# Patient Record
Sex: FEMALE | Race: WHITE | NOT HISPANIC OR LATINO
[De-identification: names, ages, dates, MRNs, and addresses within clinical notes are randomized per-mention and may not be internally consistent; named-entity substitution may affect disease eponyms.]

---
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
## 2025-03-18 ENCOUNTER — EMERGENCY (EMERGENCY)
Facility: HOSPITAL | Age: 20
LOS: 1 days | Discharge: HOME | End: 2025-03-18
Admitting: EMERGENCY MEDICINE
Payer: MEDICAID

## 2025-03-18 VITALS
SYSTOLIC BLOOD PRESSURE: 103 MMHG | RESPIRATION RATE: 16 BRPM | OXYGEN SATURATION: 100 % | TEMPERATURE: 98 F | HEIGHT: 67 IN | HEART RATE: 60 BPM | DIASTOLIC BLOOD PRESSURE: 67 MMHG | WEIGHT: 125 LBS

## 2025-03-18 VITALS — DIASTOLIC BLOOD PRESSURE: 65 MMHG | HEART RATE: 55 BPM | SYSTOLIC BLOOD PRESSURE: 107 MMHG

## 2025-03-18 PROCEDURE — 99284 EMERGENCY DEPT VISIT MOD MDM: CPT

## 2025-03-18 RX ORDER — ACETAMINOPHEN 500 MG/5ML
975 LIQUID (ML) ORAL ONCE
Refills: 0 | Status: COMPLETED | OUTPATIENT
Start: 2025-03-18 | End: 2025-03-18

## 2025-03-18 RX ORDER — OXYCODONE HYDROCHLORIDE AND ACETAMINOPHEN 10; 325 MG/1; MG/1
1 TABLET ORAL ONCE
Refills: 0 | Status: DISCONTINUED | OUTPATIENT
Start: 2025-03-18 | End: 2025-03-18

## 2025-03-18 RX ADMIN — Medication 975 MILLIGRAM(S): at 20:15

## 2025-03-18 NOTE — ED ADULT TRIAGE NOTE - CHIEF COMPLAINT QUOTE
Patient c/o R lower dental pain x 3 days. Reports she had a root canal on that tooth. No swelling noted. Denies fever. Phx CVID, anemia, asthma, fibromyalgia

## 2025-03-18 NOTE — ED ADULT NURSE NOTE - NSFALLUNIVINTERV_ED_ALL_ED
Bed/Stretcher in lowest position, wheels locked, appropriate side rails in place/Call bell, personal items and telephone in reach/Instruct patient to call for assistance before getting out of bed/chair/stretcher/Non-slip footwear applied when patient is off stretcher/[place] to call system/Physically safe environment - no spills, clutter or unnecessary equipment/Purposeful proactive rounding/Room/bathroom lighting operational, light cord in reach

## 2025-03-18 NOTE — ED PROVIDER NOTE - NSFOLLOWUPINSTRUCTIONS_ED_ALL_ED_FT
Please use 600mg motrin (or advil or ibuprofen) every 6 hours as needed for pain/discomfort/swelling.   Take Tylenol 650mg (Two 325 mg pills) every 4-6 hours as needed for pain.     Follow up with [facility] private dentist or [facility] dental clinic as soon as possible.  Call [phone] for an appointment.  Please return to the ER for severe pain, fevers, severe bleeding, increased swelling or any other concerns.   Eat a soft diet and chew on the opposite side.  Salt gargle daily.   Brush your teeth several times a day.

## 2025-03-18 NOTE — ED PROVIDER NOTE - CONSTITUTIONAL, MLM
normal... Well appearing, awake, alert, oriented to person, place, time/situation and in discomfort.

## 2025-03-18 NOTE — ED PROVIDER NOTE - PATIENT PORTAL LINK FT
You can access the FollowMyHealth Patient Portal offered by [facility] by registering at the following website: [email or web address]. By joining [email or web address]’s FollowMyHealth portal, you will also be able to view your health information using other applications (apps) compatible with our system.

## 2025-03-18 NOTE — ED PROVIDER NOTE - TOOTH FINDINGS
right lower 2nd molar tooth with cental cavity, no gum swelling, no fluctuance, no gum tenderness, + tenderness with percussion,

## 2025-03-18 NOTE — ED PROVIDER NOTE - PROGRESS NOTE DETAILS
DERIC CRISTINA: spoke with dental, if no signs of abscess, no indication for treatment in the ED, recommend OTC pain medication f/u with dentist for outpatient follow up, can give antibiotic such as Augmentin if it was recently prescribed. DERIC CRISTINA: per RN, systolic BP of 103 and HR of 55. Informed pt and stepdad of pt's vital w/ low BP and HR, will hold off Percocet which will drop BP and HR. Pt admits to feeling dizzy, jet lag, returned from [place]. Pt was offered IV fluids w/ IV Tylenol for hydration, to improve BP, and pain control. Pt states she has small veins, had bad experience with getting poke for an hr for IV access, doesn't want to go through the same experience. Pt opt for Tylenol 975mg and oral fluids for hydration. Will continue to monitor and reassess. DERIC CRISTINA: spoke with dental, if no signs of abscess, no indication for treatment in the ED, recommend OTC pain medication f/u with dentist for outpatient follow up, can give antibiotic such as Augmentin if it was recently prescribed. Informed patient and step dad of dental recommendation, patient states she is going to see a dentist tomorrow, doesn't like taking pills, would rather not start antibiotic. Will continue to monitor and reassess. PA IBETH: per RN, SBP 97. Discussed with patient regarding low BP, patient states its ideal to drink oral fluids than IV. Spoke with RN, will given jug of water and repeat BP with smaller BP cuff. Will continue to monitor and reassess. PA IBETH: pt reassessed, symptoms improved, pain 6/10. Per RN, vital w/ improving , HR was in 70 and settle at 55. Pt states she's exhausted, able to hydrate herself, feels comfortable going home. Denies any chest pain, sob, palpitation, or any other complaints. Pt was given strict return precautions for any worsening symptoms. Per pt's step dad, they're close by, will take pt back if worsening symptoms.

## 2025-03-18 NOTE — ED PROVIDER NOTE - CLINICAL SUMMARY MEDICAL DECISION MAKING FREE TEXT BOX
18 yo F with PMH of asthma, fibromyalgia, IgA/IgG deficiency, anemia, accompanied by stepfather, presents to ED c/o severe right lower molar tooth pain for a few days. well appearing female. there is no fever. no signs of dental abscess. Impression: s/p root canal of right lower molar tooth w/ pain. low suspicion for dental abscess. Plan: pain control 20 yo F with PMH of asthma, fibromyalgia, IgA/IgG deficiency, anemia, accompanied by stepfather, presents to ED c/o severe right lower molar tooth pain for a few days. well appearing female. there is no fever. no signs of dental abscess. Impression: s/p root canal of right lower molar tooth w/ pain. low suspicion for dental abscess. Plan: pain control and dental follow up tomorrow.

## 2025-03-18 NOTE — ED ADULT NURSE NOTE - IN THE PAST 12 MONTHS HAVE YOU USED DRUGS OTHER THAN THOSE REQUIRED FOR MEDICAL REASON?
What Type Of Note Output Would You Prefer (Optional)?: Bullet Format How Severe Is Your Skin Lesion?: moderate Has Your Skin Lesion Been Treated?: not been treated Is This A New Presentation, Or A Follow-Up?: Skin Lesions No

## 2025-03-18 NOTE — ED ADULT NURSE NOTE - OBJECTIVE STATEMENT
A&Ox4. ambulatory. c/o R lower dental pain x 3 days. PT states she has a dental appointment tomorrow but the pain has increased. NAD. no drooling observed and PT is talking in full sentences. pt denies SOB, chest pain, dizziness, weakness, urinary symptoms, HA, n/v/d, fevers, chills. respirations are even and unlabored. skin intact. safety precautions maintained.

## 2025-03-18 NOTE — ED PROVIDER NOTE - OBJECTIVE STATEMENT
20 yo F with PMH of asthma, fibromyalgia, IgA/IgG deficiency, anemia, accompanied by stepfather, presents to ED c/o severe right lower molar tooth pain for a few days. Reports she had root canal done in that tooth a few months ago, seen by another dentist, told it's not done correctly, there still a hole. Admits taking 3 liquid gel Advil 2 hrs ago, still 10/10, before was a 13/10, crying in pain. [place]. Denies any fever, chills, tooth drainage, or any other complaints.

## 2025-03-18 NOTE — ED PROVIDER NOTE - PROGRESS NOTE ADDITIONAL1
Fax referral addressed to Dr Carrillo received from [facility] at [place] for new patient for non-healing wound left zygoma.  Paperwork placed in [facility] Triage mail tray to determine urgency for this appt before calling patient.   Additional Progress Note...

## 2025-04-02 ENCOUNTER — APPOINTMENT (OUTPATIENT)
Dept: NEUROLOGY | Facility: CLINIC | Age: 20
End: 2025-04-02
Payer: MEDICAID

## 2025-04-02 ENCOUNTER — NON-APPOINTMENT (OUTPATIENT)
Age: 20
End: 2025-04-02

## 2025-04-02 VITALS
WEIGHT: 147 LBS | SYSTOLIC BLOOD PRESSURE: 107 MMHG | HEIGHT: 67 IN | HEART RATE: 62 BPM | DIASTOLIC BLOOD PRESSURE: 65 MMHG | BODY MASS INDEX: 23.07 KG/M2

## 2025-04-02 DIAGNOSIS — G62.9 POLYNEUROPATHY, UNSPECIFIED: ICD-10-CM

## 2025-04-02 DIAGNOSIS — R53.83 OTHER FATIGUE: ICD-10-CM

## 2025-04-02 PROCEDURE — 99205 OFFICE O/P NEW HI 60 MIN: CPT
